# Patient Record
Sex: MALE | Race: OTHER | HISPANIC OR LATINO | URBAN - METROPOLITAN AREA
[De-identification: names, ages, dates, MRNs, and addresses within clinical notes are randomized per-mention and may not be internally consistent; named-entity substitution may affect disease eponyms.]

---

## 2024-01-14 ENCOUNTER — EMERGENCY (EMERGENCY)
Facility: HOSPITAL | Age: 34
LOS: 1 days | Discharge: HOME | End: 2024-01-14
Attending: EMERGENCY MEDICINE
Payer: SELF-PAY

## 2024-01-14 VITALS
OXYGEN SATURATION: 97 % | DIASTOLIC BLOOD PRESSURE: 81 MMHG | HEIGHT: 69 IN | SYSTOLIC BLOOD PRESSURE: 136 MMHG | RESPIRATION RATE: 17 BRPM | TEMPERATURE: 98 F | WEIGHT: 210.1 LBS | HEART RATE: 91 BPM

## 2024-01-14 VITALS
RESPIRATION RATE: 18 BRPM | SYSTOLIC BLOOD PRESSURE: 132 MMHG | TEMPERATURE: 98 F | HEART RATE: 79 BPM | OXYGEN SATURATION: 98 %

## 2024-01-14 PROCEDURE — 71101 X-RAY EXAM UNILAT RIBS/CHEST: CPT

## 2024-01-14 PROCEDURE — 99284 EMERGENCY DEPT VISIT MOD MDM: CPT

## 2024-01-14 PROCEDURE — 71101 X-RAY EXAM UNILAT RIBS/CHEST: CPT | Mod: 26

## 2024-01-14 PROCEDURE — 99283 EMERGENCY DEPT VISIT LOW MDM: CPT | Mod: 25

## 2024-01-14 RX ORDER — LIDOCAINE 4 G/100G
1 CREAM TOPICAL
Qty: 5 | Refills: 0
Start: 2024-01-14 | End: 2024-01-18

## 2024-01-14 RX ORDER — IBUPROFEN 200 MG
1 TABLET ORAL
Qty: 20 | Refills: 0
Start: 2024-01-14 | End: 2024-01-18

## 2024-01-14 RX ORDER — IBUPROFEN 200 MG
600 TABLET ORAL ONCE
Refills: 0 | Status: COMPLETED | OUTPATIENT
Start: 2024-01-14 | End: 2024-01-14

## 2024-01-14 RX ADMIN — Medication 600 MILLIGRAM(S): at 12:38

## 2024-01-14 RX ADMIN — Medication 600 MILLIGRAM(S): at 13:00

## 2024-01-14 NOTE — ED ADULT NURSE NOTE - OBJECTIVE STATEMENT
pt  is  a 34 y/o male back  pain  s/p  slipped  and  fall. pt  is  a 32 y/o male back  pain  s/p  slipped  and  fall.

## 2024-01-14 NOTE — ED PROVIDER NOTE - CLINICAL SUMMARY MEDICAL DECISION MAKING FREE TEXT BOX
33-year-old  male, presents for evaluation of left-sided rib cage injury status post fall yesterday.  Well-appearing, vital signs stable, afebrile.  Minimal anterior rib cage tenderness.  Low suspicion for pneumothorax  or intra-abdominal bleeding.  Will do x-ray, ibuprofen and reassess. 33-year-old  male, presents for evaluation of left-sided rib cage injury status post fall yesterday.  Well-appearing, vital signs stable, afebrile.  Minimal anterior rib cage tenderness.  Low suspicion for pneumothorax  or intra-abdominal bleeding.  Will do x-ray, ibuprofen and reassess.    14: 16–x-ray shows no displaced rib fracture or pneumothorax.  Patient very well-appearing, no distress.  Will advise taking ibuprofen and lidocaine patch as needed for pain and follow-up with PMD within 1 week.  Abdomen is soft, nondistended and nontender.  Low suspicion for acute intra abdominal bleeding or other injury.

## 2024-01-14 NOTE — ED PROVIDER NOTE - NSFOLLOWUPINSTRUCTIONS_ED_ALL_ED_FT
Seguimiento con el médico de atención primaria en 1 semana.    Si experimenta algún síntoma nuevo o que empeora, o si está preocupado, siempre puede regresar a emergencias para april reevaluación.    **El informe radiológico oficial del radiólogo estará disponible dentro de las 24 horas. Si hay alguna discrepancia, nos comunicaremos con usted.    Si le dieron alguna receta jazzy la visita de rachel fulton según lo prescrito. Si tienes alguna manjit puedes preguntar al farmacéutico.    * * *    Follow up with the primary care doctor in 1 week.    If you experience any new or worsening symptoms or if you are concerned you can always come back to the emergency for a re-evaluation.    **Official radiology report by the radiologist will be available within 24 hours. If there is a discrepancy you will contacted.     If there were any prescriptions given to you during the visit today take them as prescribed. If you have any questions you can ask the pharmacist.

## 2024-01-14 NOTE — ED PROVIDER NOTE - OBJECTIVE STATEMENT
33-year-old male, no significant past medical history, presents for evaluation status post fall yesterday.  While in the bathroom slipped and fell landing on his left side.  No head injury or LOC.  Now complains of left-sided rib cage pain.  Took ibuprofen 200 mg earlier today with minimal improvement of symptoms.  Denies any chest pain, shortness of breath, dizziness, abdominal pain or any other complaints.

## 2024-01-14 NOTE — ED PROVIDER NOTE - PHYSICAL EXAMINATION
No rib cage ecchymosis, deformity or crepitus.  Left anterior lower rib cage and extending to the mid axillary line tenderness to palpation.  Abdomen is soft, nondistended and nontender.  No abdominal ecchymosis.

## 2024-01-14 NOTE — ED PROVIDER NOTE - PATIENT PORTAL LINK FT
You can access the FollowMyHealth Patient Portal offered by [facility] by registering at the following website: [email or web address]. By joining [employer]’s FollowMyHealth portal, you will also be able to view your health information using other applications (apps) compatible with our system. You can access the FollowMyHealth Patient Portal offered by [facility] by registering at the following website: [email or web address]. By joining [employer]’s FollowMyHealth portal, you will also be able to view your health information using other applications (apps) compatible with our system.

## 2024-01-14 NOTE — ED PROVIDER NOTE - ATTENDING APP SHARED VISIT CONTRIBUTION OF CARE
33-year-old  male, presents for evaluation of left-sided rib cage injury status post fall yesterday.  Well-appearing, vital signs stable, afebrile.  Minimal anterior rib cage tenderness.  Low suspicion for pneumothorax  or intra-abdominal bleeding.  Will do x-ray, ibuprofen and reassess.    14: 16–x-ray shows no displaced rib fracture or pneumothorax.  Patient very well-appearing, no distress.  Will advise taking ibuprofen and lidocaine patch as needed for pain and follow-up with PMD within 1 week.  Abdomen is soft, nondistended and nontender.  Low suspicion for acute intra abdominal bleeding or other injury.

## 2024-01-14 NOTE — ED ADULT NURSE NOTE - NSFALLUNIVINTERV_ED_ALL_ED
Bed/Stretcher in lowest position, wheels locked, appropriate side rails in place/Call bell, personal items and telephone in reach/Instruct patient to call for assistance before getting out of bed/chair/stretcher/Non-slip footwear applied when patient is off stretcher/[place] to call system/Physically safe environment - no spills, clutter or unnecessary equipment/Purposeful proactive rounding/Room/bathroom lighting operational, light cord in reach Bed/Stretcher in lowest position, wheels locked, appropriate side rails in place/Call bell, personal items and telephone in reach/Instruct patient to call for assistance before getting out of bed/chair/stretcher/Non-slip footwear applied when patient is off stretcher/[place] to call system/Physically safe environment - no spills, clutter or unnecessary equipment/Purposeful proactive rounding/Room/bathroom lighting operational, light cord in reach